# Patient Record
Sex: MALE | Race: WHITE
[De-identification: names, ages, dates, MRNs, and addresses within clinical notes are randomized per-mention and may not be internally consistent; named-entity substitution may affect disease eponyms.]

---

## 2018-02-05 ENCOUNTER — HOSPITAL ENCOUNTER (OUTPATIENT)
Dept: HOSPITAL 80 - FSGY | Age: 53
Discharge: HOME | End: 2018-02-05
Attending: INTERNAL MEDICINE
Payer: MEDICAID

## 2018-02-05 VITALS
OXYGEN SATURATION: 97 % | HEART RATE: 57 BPM | DIASTOLIC BLOOD PRESSURE: 86 MMHG | RESPIRATION RATE: 18 BRPM | SYSTOLIC BLOOD PRESSURE: 134 MMHG

## 2018-02-05 DIAGNOSIS — R63.4: Primary | ICD-10-CM

## 2018-02-05 NOTE — GIREPORT
[facility]

Surgical Services - Endoscopy Department

_____________________________________________________________________________________________________
_______

Patient Name: Brenden Gill                             Procedure Date: 2/5/2018 9:39 AM

MRN: [id number]                                       Account Number: [id number]

Patient Type: Outpatient                             Attending  MD/ ER Physician: LARRY Hawley

_____________________________________________________________________________________________________
_______

 

Procedure:                    Upper GI endoscopy

Indications:                  Weight loss

Providers:                    Rosendo Sanders MD

Referring MD:                 Tamiko Mejía

Medicines:                    Total IV Anesthesia (TIVA), General Anesthesia

Complications:                No immediate complications. Estimated blood loss: Minimal.

Description of Procedure:     After obtaining informed consent, the endoscope was passed under direct
 

                              vision. Throughout the procedure, the patient's blood pressure, pulse, 
and 

                              oxygen saturations were monitored continuously. The Endoscope was intro
duced 

                              through the mouth, and advanced to the third part of duodenum. The uppe
r GI 

                              endoscopy was accomplished without difficulty. The patient tolerated th
e 

                              procedure well.

Findings:                     The examined esophagus was normal.

                              Scattered mild inflammation characterized by erosions, friability and 

                              granularity was found in the gastric antrum. Biopsies were taken with a
 cold 

                              forceps for histology. Estimated blood loss was minimal.

                              The examined duodenum was normal.

                              The exam was otherwise without abnormality.

Estimated Blood Loss:         Estimated blood loss was minimal.

Post Op Diagnosis:            - Normal esophagus.

                              - Gastritis. Biopsied.

                              - Normal examined duodenum.

                              - The examination was otherwise normal.

Recommendation:               - Await pathology results.

                              - My office will call with the pathology result with 5-7 days. If you h
ave 

                              not heard from my office by 12-14, do not assume the pathology is fran
l, 

                              please call [phone] to get the pathology results.

                              - Use Protonix (pantoprazole) 40 mg PO daily for 2 months.

                              - Perform a colonoscopy today.

                              - Return to primary care physician as previously scheduled.

                              - Thank you for allowing me to help in your patient's care. Do not hesi
hewitt 

                              to call with any questions.

Attending Participation:

     I personally performed the entire procedure.

 

Tommy Robbins M.D

___________________

Rosendo Sanders MD

2/5/2018 10:35:16 AM

This report has been signed electronicallyMathew MD Tommy

Number of Addenda: 0

 

Note Initiated On: 2/5/2018 9:39 AM

[email or web address]

## 2018-02-05 NOTE — GIREPORT
[facility]

Surgical Services - Endoscopy Department

_____________________________________________________________________________________________________
_______

Patient Name: Brenden Gill                             Procedure Date: 2/5/2018 9:37 AM

MRN: [id number]                                       Account Number: [id number]

Patient Type: Outpatient                             Attending  MD/ ER Physician: LARRY Hawley

_____________________________________________________________________________________________________
_______

 

Procedure:                    Colonoscopy

Indications:                  Weight loss

Providers:                    Rosendo Sanders MD

Referring MD:                 Tamiko Mejía

Medicines:                    General Anesthesia, Total IV Anesthesia (TIVA)

Complications:                No immediate complications. Estimated blood loss: None.

Description of Procedure:     After obtaining informed consent, the scope was passed under direct vis
ion. 

                              Throughout the procedure, the patient's blood pressure, pulse, and oxyg
en 

                              saturations were monitored continuously. The Colonoscope was introduced
 

                              through the anus with the intention of advancing to the ileum. The scop
e was 

                              advanced to the hepatic flexure before the procedure was aborted. 

                              Medications were given. The colonoscopy was performed without difficult
y. 

                              The patient tolerated the procedure well. The quality of the bowel 

                              preparation was inadequate.

Findings:                     The digital rectal exam was normal.

                              Copious quantities of semi-solid stool was found in the rectum, in the 


                              sigmoid colon, in the descending colon and in the transverse colon, 

                              precluding visualization. Lavage of the area was performed using copiou
s 

                              amounts of sterile water, resulting in incomplete clearance with contin
ued 

                              poor visualization.

                              The exam was otherwise without abnormality.

Estimated Blood Loss:         Estimated blood loss: none.

Post Op Diagnosis:            - Preparation of the colon was inadequate.

                              - Stool in the rectum, in the sigmoid colon, in the descending colon an
d in 

                              the transverse colon.

                              - The examination was otherwise normal.

                              - No specimens collected.

Recommendation:               - Recommend Cologuard test at appointment to be scheduled. If that is 

                              positive then can consider repeat colonoscopy with two day prep. I woul
d not 

                              use colonoscopy as his cancer screening test. I recommend Cologuard for
 this 

                              patient.

                              - Discharge patient to home (with escort).

                              - Return to primary care physician as previously scheduled.

                              - See EGD for other recommendations

                              - Thank you for allowing me to help in your patient's care. Do not hesi
hewitt 

                              to call with any questions.

Attending Participation:

     I personally performed the entire procedure.

 

Tommy Robbins M.D

___________________

Rosendo Sanders MD

2/5/2018 10:32:32 AM

This report has been signed electronicallyMathew MD Tommy

Number of Addenda: 0

 

Note Initiated On: 2/5/2018 9:37 AM

[email or web address]

## 2018-02-05 NOTE — PDANEPAE
ANE History of Present Illness





weight loss





ANE Past Medical History





- Cardiovascular History


Hx Hypertension: No


Hx Arrhythmias: No


Hx Chest Pain: No


Hx Coronary Artery / Peripheral Vascular Disease: No


Hx CHF / Valvular Disease: No


Hx Palpitations: No





- Pulmonary History


Hx COPD: No


Hx Asthma/Reactive Airway Disease: No


Hx Recent Upper Respiratory Infection: No


Hx Oxygen in Use at Home: No


Hx Sleep Apnea: No


Sleep Apnea Screening Result - Last Documented: Negative





- Neurologic History


Hx Cerebrovascular Accident: No


Hx Seizures: No


Hx Dementia: No





- Endocrine History


Hx Diabetes: No





- Renal History


Hx Renal Disorders: No





- Liver History


Hx Hepatic Disorders: No





- Neurological & Psychiatric Hx


Hx Neurological and Psychiatric Disorders: Yes


Neurological / Psychiatric History Comment: depression,dyskinesia,schizophrenia,

intermittent explosive disorder





- Cancer History


Hx Cancer: No





- Congenital Disorder History


Hx Congenital Disorders: No





- GI History


Hx Gastrointestinal Disorders: Yes


Gastrointestinal History Comment: IBS,constipation





- Other Health History


Other Health History: none





- Chronic Pain History


Chronic Pain: No





- Surgical History


Prior Surgeries: none





ANE Review of Systems


Review of Systems: 








- Exercise capacity


METS (RN): 4 METS





ANE Patient History





- Allergies


Allergies/Adverse Reactions: 








amoxicillin [From Augmentin] Allergy (Verified 01/18/18 11:54)


 Other-Enter Comments


carbamazepine [From Tegretol] Allergy (Verified 01/18/18 11:54)


 Other-Enter Comments


clavulanic acid [From Augmentin] Allergy (Verified 01/18/18 11:54)


 Other-Enter Comments








- Home Medications


Home Medications: 








Aspirin 81mg (*)  01/18/18 [Last Taken 02/04/18]


Benztropine Mesylate  01/18/18 [Last Taken 02/04/18 09:00]


Catapres  01/18/18 [Last Taken 02/04/18 09:00]


Chlorpromazine HCl  01/18/18 [Last Taken 02/04/18 09:00]


Citracal  01/18/18 [Last Taken 02/04/18 09:00]


FIBERCON  01/18/18 [Last Taken 02/04/18 09:00]


Lactulose  01/18/18 [Last Taken 02/04/18 09:00]


Loratadine  01/18/18 [Last Taken 02/04/18 09:00]


Lorazepam  01/18/18 [Last Taken 02/04/18]


Lotemax0.5% Gel  01/18/18 [Last Taken 02/04/18]


Oxybutynin  01/18/18 [Last Taken 02/04/18 09:00]


Thiothixene  01/18/18 [Last Taken 02/04/18 09:00]


Zyprexa  01/18/18 [Last Taken 02/03/18 20:00]








- NPO status


NPO Since - Liquids (Date): 02/04/18


NPO Since - Liquids (Time): 23:55


NPO Since - Solids (Date): 02/04/18


NPO Since - Solids (Time): 08:00





- Smoking Hx


Smoking Status: Never smoked





- Family Anes Hx


Family Hx Anesthesia Complications: none





ANE Labs/Vital Signs





- Vital Signs


Blood Pressure: 107/76


Heart Rate: 94


Respiratory Rate: 18


O2 Sat (%): 94


Height: 170.18 cm


Weight: 58.967 kg





ANE Physical Exam





- Airway


Neck exam: FROM


Mallampati Score: Unable to assesss





- Pulmonary


Pulmonary: no respiratory distress





- Cardiovascular


Cardiovascular: regular rate and rhythym





- ASA Status


ASA Status: III





ANE Anesthesia Plan


Total IV Anesthesia: Yes

## 2018-02-05 NOTE — PDGENHP
History & Physical


Chief Complaint: weight loss


History of Present Illness: weight loss


Pertinent Past, Social, Family History: no tobnacacco, no alcohol, lives at 

facility fhx no cc


Relevant Physical Exam: cta.  s1, s2.  +bs, soft


Cardiorespiratory Assessment: class 3